# Patient Record
Sex: MALE
[De-identification: names, ages, dates, MRNs, and addresses within clinical notes are randomized per-mention and may not be internally consistent; named-entity substitution may affect disease eponyms.]

---

## 2019-03-01 PROBLEM — Z00.00 ENCOUNTER FOR PREVENTIVE HEALTH EXAMINATION: Status: ACTIVE | Noted: 2019-03-01

## 2019-03-11 ENCOUNTER — APPOINTMENT (OUTPATIENT)
Dept: ENDOCRINOLOGY | Facility: CLINIC | Age: 54
End: 2019-03-11
Payer: COMMERCIAL

## 2019-03-11 VITALS
WEIGHT: 240 LBS | OXYGEN SATURATION: 97 % | RESPIRATION RATE: 16 BRPM | DIASTOLIC BLOOD PRESSURE: 80 MMHG | HEIGHT: 72 IN | HEART RATE: 94 BPM | BODY MASS INDEX: 32.51 KG/M2 | SYSTOLIC BLOOD PRESSURE: 130 MMHG

## 2019-03-11 DIAGNOSIS — I82.409 ACUTE EMBOLISM AND THROMBOSIS OF UNSPECIFIED DEEP VEINS OF UNSPECIFIED LOWER EXTREMITY: ICD-10-CM

## 2019-03-11 PROCEDURE — 99244 OFF/OP CNSLTJ NEW/EST MOD 40: CPT | Mod: 25

## 2019-03-11 RX ORDER — RIVAROXABAN 20 MG/1
20 TABLET, FILM COATED ORAL
Refills: 0 | Status: ACTIVE | COMMUNITY

## 2019-03-11 RX ORDER — FLASH GLUCOSE SENSOR
KIT MISCELLANEOUS
Qty: 2 | Refills: 12 | Status: ACTIVE | COMMUNITY
Start: 2019-03-11 | End: 1900-01-01

## 2019-03-11 RX ORDER — BLOOD SUGAR DIAGNOSTIC
STRIP MISCELLANEOUS 4 TIMES DAILY
Qty: 400 | Refills: 3 | Status: ACTIVE | COMMUNITY
Start: 2019-03-11 | End: 1900-01-01

## 2019-03-11 RX ORDER — FLASH GLUCOSE SCANNING READER
EACH MISCELLANEOUS
Qty: 1 | Refills: 0 | Status: ACTIVE | COMMUNITY
Start: 2019-03-11 | End: 1900-01-01

## 2019-03-11 RX ORDER — LANCETS 30 GAUGE
EACH MISCELLANEOUS
Qty: 400 | Refills: 3 | Status: ACTIVE | COMMUNITY
Start: 2019-03-11 | End: 1900-01-01

## 2019-03-11 NOTE — CONSULT LETTER
[Dear  ___] : Dear  [unfilled], [Sincerely,] : Sincerely, [FreeTextEntry1] : Thank you for referring  Mr. GEMA EVANS to me for evaluation and treatment. Please, see attached consultation note. As always, if there are specific questions you would like to discuss, please feel free to contact me.\par Thank you for the courtesy of this evaluation.\par  [FreeTextEntry3] : Cyndie Bocanegra MD, FACE, ECNU\par

## 2019-03-11 NOTE — HISTORY OF PRESENT ILLNESS
[FreeTextEntry1] : HISTORY OF PRESENT ILLNESS. \par \par Mr. EVANS was diagnosed with Diabetes Mellitus Type 2 in 2016. \par Denies known complications of retinopathy, nephropathy, or neuropathy. \par Reports history HTN, dyslipidemia. Patient is accompanied by his mother who provides most of the information. She believes,that  he had  an angiogram done some time ago, which "showed some blockage".  \par Presently on metformin 500mg 2 tabs BID, metoprolol xl 25mg, lipitor 10mg HS. He was prescribed jardiance, but has not started yet.\par Not checking BS at home (unable to learn how to do it)\par Fingerstick glucose in the office today is 248 mg/dL 3  hours after eating. \par Diet:. non following ADA diet\par Exercise: none\par \par Last dilated eye -  long time ago.\par Last podiatry visit  - none\par Last cardiology evaluation - Dr. Appiah (01/19)\par Last stress test - several years ago\par Last 2-D Echo - several years ag.\par Last nephrology evaluation - none\par Last neurology evaluation- none\par \par Lab review (01/19)- a1c - 7.8, creat- 0.8, LDL-69, TG-98, HDL- 42, B12- 427, TSH- 1.38\par \par

## 2019-03-11 NOTE — ASSESSMENT
[Carbohydrate Consistent Diet] : carbohydrate consistent diet [Hypoglycemia Management] : hypoglycemia management [Diabetes Foot Care] : diabetes foot care [Long Term Vascular Complications] : long term vascular complications of diabetes [Self Monitoring of Blood Glucose] : self monitoring of blood glucose [FreeTextEntry1] : Current approaches to diabetes management are discussed with the patient. \par Target ranges for blood sugar, blood pressure and cholesterol reviewed, and risk reduction strategies verified. \par Hypoglycemia precautions reviewed with the patient. \par Suggested extensive diabetes education program, including nutritional and diabetes teaching and evaluation. \par Proper dietary restrictions and exercise routines discussed. \par Glucometer/SMBG and log book charting discussed.\par - d/c metformin\par - janumet 50/1g bid (R+B), jardiance 10mg qd (R+B)\par - hydration\par - continue lipitor, toprol. Monitor urine for microlabumins. Might require ACE/ARBs\par - SMBG, log book. FreeStyle louie\par - RTC 2 months. labs prior

## 2019-05-13 ENCOUNTER — APPOINTMENT (OUTPATIENT)
Dept: ENDOCRINOLOGY | Facility: CLINIC | Age: 54
End: 2019-05-13
Payer: COMMERCIAL

## 2019-05-13 VITALS
RESPIRATION RATE: 17 BRPM | OXYGEN SATURATION: 97 % | WEIGHT: 240 LBS | SYSTOLIC BLOOD PRESSURE: 124 MMHG | BODY MASS INDEX: 32.51 KG/M2 | DIASTOLIC BLOOD PRESSURE: 70 MMHG | HEIGHT: 72 IN

## 2019-05-13 LAB — GLUCOSE BLDC GLUCOMTR-MCNC: 129

## 2019-05-13 PROCEDURE — 95251 CONT GLUC MNTR ANALYSIS I&R: CPT

## 2019-05-13 PROCEDURE — 99214 OFFICE O/P EST MOD 30 MIN: CPT | Mod: 25

## 2019-05-13 PROCEDURE — 82962 GLUCOSE BLOOD TEST: CPT

## 2019-05-13 RX ORDER — METFORMIN HYDROCHLORIDE 500 MG/1
500 TABLET, COATED ORAL
Refills: 0 | Status: DISCONTINUED | COMMUNITY
End: 2019-05-13

## 2019-05-13 NOTE — ASSESSMENT
[Carbohydrate Consistent Diet] : carbohydrate consistent diet [Long Term Vascular Complications] : long term vascular complications of diabetes [Diabetes Foot Care] : diabetes foot care [Hypoglycemia Management] : hypoglycemia management [Self Monitoring of Blood Glucose] : self monitoring of blood glucose [FreeTextEntry1] : - janumet 50/1g bid , incr jardiance 25mg qd \par - prandin 1mg ac breakfast\par - will consider GLP1 agonists next visit (pt is reluctant at present)\par - hydration\par - continue lipitor, toprol. Monitor urine for microalbumin. Might require ACE/ARBs\par - SMBG, log book. Improve diet\par - RTC 3 months. labs prior

## 2019-05-13 NOTE — HISTORY OF PRESENT ILLNESS
[FreeTextEntry1] : Feels well on  janumet 50/1g bid , jardiance 10mg qd \par Jose 14 \par Days analyzed: 4/30/19-5/13/19\par in target- 69% of the time, 31 % above 180 with 5% above 250\par spikes after breakfast\par a1c- 7.8, g/m- 1.0, creat 0.67, K- 4.6\par \par \par HISTORY OF PRESENT ILLNESS. \par \par Mr. EVANS was diagnosed with Diabetes Mellitus Type 2 in 2016. \par Denies known complications of retinopathy, nephropathy, or neuropathy. \par Reports history HTN, dyslipidemia. Patient is accompanied by his mother who provides most of the information. She believes,that  he had  an angiogram done some time ago, which "showed some blockage".  \par Presently on metformin 500mg 2 tabs BID, metoprolol xl 25mg, lipitor 10mg HS. He was prescribed jardiance, but has not started yet.\par Not checking BS at home (unable to learn how to do it)\par Fingerstick glucose in the office today is 248 mg/dL 3  hours after eating. \par Diet:. non following ADA diet\par Exercise: none\par \par Last dilated eye -  long time ago.\par Last podiatry visit  - none\par Last cardiology evaluation - Dr. Appiah (01/19)\par Last stress test - several years ago\par Last 2-D Echo - several years ag.\par Last nephrology evaluation - none\par Last neurology evaluation- none\par \par Lab review (01/19)- a1c - 7.8, creat- 0.8, LDL-69, TG-98, HDL- 42, B12- 427, TSH- 1.38\par \par

## 2019-09-16 ENCOUNTER — LABORATORY RESULT (OUTPATIENT)
Age: 54
End: 2019-09-16

## 2019-09-23 ENCOUNTER — APPOINTMENT (OUTPATIENT)
Dept: ENDOCRINOLOGY | Facility: CLINIC | Age: 54
End: 2019-09-23
Payer: COMMERCIAL

## 2019-09-23 VITALS
HEART RATE: 94 BPM | DIASTOLIC BLOOD PRESSURE: 70 MMHG | OXYGEN SATURATION: 97 % | WEIGHT: 241 LBS | SYSTOLIC BLOOD PRESSURE: 130 MMHG | BODY MASS INDEX: 32.64 KG/M2 | RESPIRATION RATE: 17 BRPM | HEIGHT: 72 IN

## 2019-09-23 LAB — GLUCOSE BLDC GLUCOMTR-MCNC: 113

## 2019-09-23 PROCEDURE — 99214 OFFICE O/P EST MOD 30 MIN: CPT | Mod: 25

## 2019-09-23 PROCEDURE — 95251 CONT GLUC MNTR ANALYSIS I&R: CPT

## 2019-09-23 NOTE — HISTORY OF PRESENT ILLNESS
[FreeTextEntry1] : F/u for DM2\par \par *** Sep 23, 2019 ***\par \par on  janumet 50/1g bid ,  jardiance 25mg qd , prandin 1mg ac breakfast\par Feels well \par a1c- 6.9, f/am- 242\par LDL- 73, TG- 63, HDL- 31\par TSH- 2.2\par \par Jose 14 \par Days analyzed: 9/15/19- 9/23/19\par in target- 85% of the time, 5 % above 180 with 0% above 250, below 70- 10%\par *** May 13, 2019 ***\par \par Jose 14 \par Days analyzed: 4/30/19-5/13/19\par in target- 69% of the time, 31 % above 180 with 5% above 250\par spikes after breakfast\par a1c- 7.8, g/m- 1.0, creat 0.67, K- 4.6\par \par \par HISTORY OF PRESENT ILLNESS. \par \par Mr. EVANS was diagnosed with Diabetes Mellitus Type 2 in 2016. \par Denies known complications of retinopathy, nephropathy, or neuropathy. \par Reports history HTN, dyslipidemia. Patient is accompanied by his mother who provides most of the information. She believes,that  he had  an angiogram done some time ago, which "showed some blockage".  \par Presently on metformin 500mg 2 tabs BID, metoprolol xl 25mg, lipitor 10mg HS. He was prescribed jardiance, but has not started yet.\par Not checking BS at home (unable to learn how to do it)\par Fingerstick glucose in the office today is 248 mg/dL 3  hours after eating. \par Diet:. non following ADA diet\par Exercise: none\par \par Last dilated eye -  long time ago.\par Last podiatry visit  - none\par Last cardiology evaluation - Dr. Appiah (01/19)\par Last stress test - several years ago\par Last 2-D Echo - several years ag.\par Last nephrology evaluation - none\par Last neurology evaluation- none\par \par Lab review (01/19)- a1c - 7.8, creat- 0.8, LDL-69, TG-98, HDL- 42, B12- 427, TSH- 1.38\par \par

## 2019-09-23 NOTE — ASSESSMENT
[Carbohydrate Consistent Diet] : carbohydrate consistent diet [Hypoglycemia Management] : hypoglycemia management [Diabetes Foot Care] : diabetes foot care [Long Term Vascular Complications] : long term vascular complications of diabetes [Self Monitoring of Blood Glucose] : self monitoring of blood glucose [FreeTextEntry1] : - janumet 50/1g bid , jardiance 25mg qd \par - hold prandin for now. Consider acarbose vs GLP1 agonists next visit (pt is reluctant at present)\par - hydration\par - continue lipitor, toprol. Monitor urine for microalbumin. Might require ACE/ARBs\par - incr exercise\par - SMBG, log book. Improve diet\par - RTC 3 months. labs prior

## 2019-12-10 ENCOUNTER — RX RENEWAL (OUTPATIENT)
Age: 54
End: 2019-12-10

## 2020-01-28 LAB
ALBUMIN SERPL ELPH-MCNC: 4.9 G/DL
ALP BLD-CCNC: 100 U/L
ALT SERPL-CCNC: 18 U/L
ANION GAP SERPL CALC-SCNC: 16 MMOL/L
AST SERPL-CCNC: 16 U/L
BILIRUB SERPL-MCNC: 0.5 MG/DL
BUN SERPL-MCNC: 16 MG/DL
CALCIUM SERPL-MCNC: 10.2 MG/DL
CHLORIDE SERPL-SCNC: 100 MMOL/L
CHOLEST SERPL-MCNC: 149 MG/DL
CHOLEST/HDLC SERPL: 4.1 RATIO
CO2 SERPL-SCNC: 26 MMOL/L
CREAT SERPL-MCNC: 0.7 MG/DL
ESTIMATED AVERAGE GLUCOSE: 151 MG/DL
FRUCTOSAMINE SERPL-MCNC: 273 UMOL/L
GLUCOSE SERPL-MCNC: 136 MG/DL
HBA1C MFR BLD HPLC: 6.9 %
HDLC SERPL-MCNC: 36 MG/DL
LDLC SERPL CALC-MCNC: 95 MG/DL
POTASSIUM SERPL-SCNC: 4.9 MMOL/L
PROT SERPL-MCNC: 7.8 G/DL
SODIUM SERPL-SCNC: 142 MMOL/L
T4 FREE SERPL-MCNC: 1.2 NG/DL
TRIGL SERPL-MCNC: 87 MG/DL
TSH SERPL-ACNC: 1.92 UIU/ML

## 2020-01-29 LAB — 25(OH)D3 SERPL-MCNC: 49.2 NG/ML

## 2020-02-03 ENCOUNTER — RESULT CHARGE (OUTPATIENT)
Age: 55
End: 2020-02-03

## 2020-02-03 ENCOUNTER — APPOINTMENT (OUTPATIENT)
Dept: ENDOCRINOLOGY | Facility: CLINIC | Age: 55
End: 2020-02-03
Payer: COMMERCIAL

## 2020-02-03 VITALS
WEIGHT: 259 LBS | HEART RATE: 106 BPM | HEIGHT: 72 IN | OXYGEN SATURATION: 94 % | RESPIRATION RATE: 18 BRPM | SYSTOLIC BLOOD PRESSURE: 140 MMHG | DIASTOLIC BLOOD PRESSURE: 70 MMHG | BODY MASS INDEX: 35.08 KG/M2

## 2020-02-03 LAB — GLUCOSE BLDC GLUCOMTR-MCNC: 118

## 2020-02-03 PROCEDURE — 99214 OFFICE O/P EST MOD 30 MIN: CPT

## 2020-02-03 RX ORDER — SITAGLIPTIN AND METFORMIN HYDROCHLORIDE 50; 1000 MG/1; MG/1
50-1000 TABLET, FILM COATED ORAL TWICE DAILY
Qty: 180 | Refills: 2 | Status: DISCONTINUED | COMMUNITY
Start: 2019-03-11 | End: 2020-02-03

## 2020-02-03 NOTE — ASSESSMENT
[Carbohydrate Consistent Diet] : carbohydrate consistent diet [Hypoglycemia Management] : hypoglycemia management [Diabetes Foot Care] : diabetes foot care [Long Term Vascular Complications] : long term vascular complications of diabetes [Self Monitoring of Blood Glucose] : self monitoring of blood glucose [FreeTextEntry1] : - janumet is no longer covered- will switch to jentadueto 2.5-1000mg bid, continue jardiance 25mg qd \par - d/c prandin. Consider acarbose vs GLP1 agonists next visit (pt is reluctant at present)\par - hydration\par - resume  toprol XL 50 mg. Monitor urine for microalbumin. Might require ACE/ARBs\par - increase lipitor 20 mg HS\par - incr exercise\par - SMBG, log book. Improve diet\par - RTC 3 months. labs prior

## 2020-02-03 NOTE — HISTORY OF PRESENT ILLNESS
[FreeTextEntry1] : F/u for DM2\par \par *** Feb 03, 2020 ***\par \par on  janumet 50/1g bid , jardiance 25mg qd, lipitor 10 mg HS\par stopped taking toprol\par occasionally uses prandin ac breakfast\par per pt- janumet is no longer covered\par a1c- 6.9\par LDL- 95\par \par no log, reports occas hypo's post breakfast when using prandin\par \par *** Sep 23, 2019 ***\par \par on  janumet 50/1g bid ,  jardiance 25mg qd , prandin 1mg ac breakfast\par Feels well \par a1c- 6.9, f/am- 242\par LDL- 73, TG- 63, HDL- 31\par TSH- 2.2\par \par Jose 14 \par Days analyzed: 9/15/19- 9/23/19\par in target- 85% of the time, 5 % above 180 with 0% above 250, below 70- 10%\par \par *** May 13, 2019 ***\par \par Jose 14 \par Days analyzed: 4/30/19-5/13/19\par in target- 69% of the time, 31 % above 180 with 5% above 250\par spikes after breakfast\par a1c- 7.8, g/m- 1.0, creat 0.67, K- 4.6\par \par \par HISTORY OF PRESENT ILLNESS. \par \par Mr. EVANS was diagnosed with Diabetes Mellitus Type 2 in 2016. \par Denies known complications of retinopathy, nephropathy, or neuropathy. \par Reports history HTN, dyslipidemia. Patient is accompanied by his mother who provides most of the information. She believes,that  he had  an angiogram done some time ago, which "showed some blockage".  \par Presently on metformin 500mg 2 tabs BID, metoprolol xl 25mg, lipitor 10mg HS. He was prescribed jardiance, but has not started yet.\par Not checking BS at home (unable to learn how to do it)\par Fingerstick glucose in the office today is 248 mg/dL 3  hours after eating. \par Diet:. non following ADA diet\par Exercise: none\par \par Last dilated eye -  long time ago.\par Last podiatry visit  - none\par Last cardiology evaluation - Dr. Appiah (01/19)\par Last stress test - several years ago\par Last 2-D Echo - several years ag.\par Last nephrology evaluation - none\par Last neurology evaluation- none\par \par Lab review (01/19)- a1c - 7.8, creat- 0.8, LDL-69, TG-98, HDL- 42, B12- 427, TSH- 1.38\par \par

## 2020-02-13 ENCOUNTER — RX RENEWAL (OUTPATIENT)
Age: 55
End: 2020-02-13

## 2020-05-04 ENCOUNTER — APPOINTMENT (OUTPATIENT)
Dept: ENDOCRINOLOGY | Facility: CLINIC | Age: 55
End: 2020-05-04

## 2020-11-03 ENCOUNTER — RX RENEWAL (OUTPATIENT)
Age: 55
End: 2020-11-03

## 2021-01-24 ENCOUNTER — RX RENEWAL (OUTPATIENT)
Age: 56
End: 2021-01-24

## 2021-01-31 ENCOUNTER — RX RENEWAL (OUTPATIENT)
Age: 56
End: 2021-01-31

## 2021-02-18 ENCOUNTER — RX RENEWAL (OUTPATIENT)
Age: 56
End: 2021-02-18

## 2021-04-22 ENCOUNTER — RX RENEWAL (OUTPATIENT)
Age: 56
End: 2021-04-22

## 2021-07-11 ENCOUNTER — RX RENEWAL (OUTPATIENT)
Age: 56
End: 2021-07-11

## 2021-09-27 LAB
25(OH)D3 SERPL-MCNC: 55.8 NG/ML
ALBUMIN SERPL ELPH-MCNC: 4.4 G/DL
ALP BLD-CCNC: 97 U/L
ALT SERPL-CCNC: 19 U/L
ANION GAP SERPL CALC-SCNC: 14 MMOL/L
AST SERPL-CCNC: 11 U/L
BASOPHILS # BLD AUTO: 0.05 K/UL
BASOPHILS NFR BLD AUTO: 0.8 %
BILIRUB SERPL-MCNC: 0.3 MG/DL
BUN SERPL-MCNC: 13 MG/DL
CALCIUM SERPL-MCNC: 9.5 MG/DL
CHLORIDE SERPL-SCNC: 102 MMOL/L
CHOLEST SERPL-MCNC: 117 MG/DL
CO2 SERPL-SCNC: 23 MMOL/L
CREAT SERPL-MCNC: 0.69 MG/DL
EOSINOPHIL # BLD AUTO: 0.26 K/UL
EOSINOPHIL NFR BLD AUTO: 4.2 %
FOLATE SERPL-MCNC: >20 NG/ML
FRUCTOSAMINE SERPL-MCNC: 352 UMOL/L
GLUCOSE SERPL-MCNC: 170 MG/DL
HCT VFR BLD CALC: 45.8 %
HDLC SERPL-MCNC: 32 MG/DL
HGB BLD-MCNC: 14.6 G/DL
IMM GRANULOCYTES NFR BLD AUTO: 0.5 %
LDLC SERPL CALC-MCNC: 68 MG/DL
LYMPHOCYTES # BLD AUTO: 1.43 K/UL
LYMPHOCYTES NFR BLD AUTO: 23 %
MAN DIFF?: NORMAL
MCHC RBC-ENTMCNC: 28 PG
MCHC RBC-ENTMCNC: 31.9 GM/DL
MCV RBC AUTO: 87.9 FL
MONOCYTES # BLD AUTO: 0.54 K/UL
MONOCYTES NFR BLD AUTO: 8.7 %
NEUTROPHILS # BLD AUTO: 3.92 K/UL
NEUTROPHILS NFR BLD AUTO: 62.8 %
NONHDLC SERPL-MCNC: 85 MG/DL
PLATELET # BLD AUTO: 246 K/UL
POTASSIUM SERPL-SCNC: 4.5 MMOL/L
PROT SERPL-MCNC: 7.1 G/DL
RBC # BLD: 5.21 M/UL
RBC # FLD: 13.9 %
SODIUM SERPL-SCNC: 139 MMOL/L
T4 FREE SERPL-MCNC: 1.2 NG/DL
TRIGL SERPL-MCNC: 84 MG/DL
TSH SERPL-ACNC: 1.65 UIU/ML
VIT B12 SERPL-MCNC: 1020 PG/ML
WBC # FLD AUTO: 6.23 K/UL

## 2021-09-28 LAB
CREAT SPEC-SCNC: 72 MG/DL
ESTIMATED AVERAGE GLUCOSE: 235 MG/DL
HBA1C MFR BLD HPLC: 9.8 %
MICROALBUMIN 24H UR DL<=1MG/L-MCNC: 1.5 MG/DL
MICROALBUMIN/CREAT 24H UR-RTO: 21 MG/G

## 2021-10-05 ENCOUNTER — APPOINTMENT (OUTPATIENT)
Dept: ENDOCRINOLOGY | Facility: CLINIC | Age: 56
End: 2021-10-05
Payer: COMMERCIAL

## 2021-10-05 VITALS
SYSTOLIC BLOOD PRESSURE: 119 MMHG | WEIGHT: 250 LBS | RESPIRATION RATE: 17 BRPM | TEMPERATURE: 98 F | HEART RATE: 86 BPM | BODY MASS INDEX: 33.86 KG/M2 | DIASTOLIC BLOOD PRESSURE: 70 MMHG | HEIGHT: 72 IN | OXYGEN SATURATION: 98 %

## 2021-10-05 DIAGNOSIS — E11.65 TYPE 2 DIABETES MELLITUS WITH HYPERGLYCEMIA: ICD-10-CM

## 2021-10-05 DIAGNOSIS — I10 ESSENTIAL (PRIMARY) HYPERTENSION: ICD-10-CM

## 2021-10-05 DIAGNOSIS — E78.5 HYPERLIPIDEMIA, UNSPECIFIED: ICD-10-CM

## 2021-10-05 LAB — GLUCOSE BLDC GLUCOMTR-MCNC: 155

## 2021-10-05 PROCEDURE — 99214 OFFICE O/P EST MOD 30 MIN: CPT

## 2021-10-05 PROCEDURE — 95250 CONT GLUC MNTR PHYS/QHP EQP: CPT

## 2021-10-05 PROCEDURE — 95251 CONT GLUC MNTR ANALYSIS I&R: CPT

## 2021-10-05 RX ORDER — LINAGLIPTIN AND METFORMIN HYDROCHLORIDE 2.5; 1 MG/1; MG/1
2.5-1 TABLET, FILM COATED ORAL
Qty: 180 | Refills: 3 | Status: ACTIVE | COMMUNITY
Start: 2020-02-03 | End: 1900-01-01

## 2021-10-05 RX ORDER — REPAGLINIDE 1 MG/1
1 TABLET ORAL
Qty: 90 | Refills: 2 | Status: DISCONTINUED | COMMUNITY
Start: 2019-05-13 | End: 2021-10-05

## 2021-10-05 RX ORDER — ATORVASTATIN CALCIUM 20 MG/1
20 TABLET, FILM COATED ORAL
Qty: 90 | Refills: 3 | Status: ACTIVE | COMMUNITY
Start: 2021-01-31 | End: 1900-01-01

## 2021-10-05 RX ORDER — EMPAGLIFLOZIN 25 MG/1
25 TABLET, FILM COATED ORAL DAILY
Qty: 90 | Refills: 3 | Status: ACTIVE | COMMUNITY
Start: 1900-01-01 | End: 1900-01-01

## 2021-10-05 NOTE — HISTORY OF PRESENT ILLNESS
[FreeTextEntry1] : F/u for diabetes management\par \par *** Oct 05, 2021 ***\par \par missed f/u visits\par ran out of jentadueto, taking metformin 1000 mg bid, jardiance 25 mg qd\par no recent labs\par \par *** Feb 03, 2020 ***\par \par on  janumet 50/1g bid , jardiance 25mg qd, lipitor 10 mg HS\par stopped taking toprol\par occasionally uses prandin ac breakfast\par per pt- janumet is no longer covered\par a1c- 6.9\par LDL- 95\par \par no log, reports occas hypo's post breakfast when using prandin\par \par *** Sep 23, 2019 ***\par \par on  janumet 50/1g bid ,  jardiance 25mg qd , prandin 1mg ac breakfast\par Feels well \par a1c- 6.9, f/am- 242\par LDL- 73, TG- 63, HDL- 31\par TSH- 2.2\par \par Jose 14 \par Days analyzed: 9/15/19- 9/23/19\par in target- 85% of the time, 5 % above 180 with 0% above 250, below 70- 10%\par \par *** May 13, 2019 ***\par \par Jose 14 \par Days analyzed: 4/30/19-5/13/19\par in target- 69% of the time, 31 % above 180 with 5% above 250\par spikes after breakfast\par a1c- 7.8, g/m- 1.0, creat 0.67, K- 4.6\par \par \par HISTORY OF PRESENT ILLNESS. \par \par Mr. EVANS was diagnosed with Diabetes Mellitus Type 2 in 2016. \par Denies known complications of retinopathy, nephropathy, or neuropathy. \par Reports history HTN, dyslipidemia. Patient is accompanied by his mother who provides most of the information. She believes,that  he had  an angiogram done some time ago, which "showed some blockage".  \par Presently on metformin 500mg 2 tabs BID, metoprolol xl 25mg, lipitor 10mg HS. He was prescribed jardiance, but has not started yet.\par Not checking BS at home (unable to learn how to do it)\par Fingerstick glucose in the office today is 248 mg/dL 3  hours after eating. \par Diet:. non following ADA diet\par Exercise: none\par \par Lab review (01/19)- a1c - 7.8, creat- 0.8, LDL-69, TG-98, HDL- 42, B12- 427, TSH- 1.38\par \par ***\par \par Last dilated eye -  long time ago.\par Last podiatry visit  - 9/21\par Last cardiology evaluation - Dr. Appiah (01/19)\par Last stress test - several years ago\par Last 2-D Echo - several years ag.\par Last nephrology evaluation - none\par Last neurology evaluation- none

## 2021-10-05 NOTE — ASSESSMENT
[Importance of Diet and Exercise] : importance of diet and exercise to improve glycemic control, achieve weight loss and improve cardiovascular health [Exercise/Effect on Glucose] : exercise/effect on glucose [Glucagon Use] : glucagon use [Retinopathy Screening] : Patient was referred to ophthalmology for retinopathy screening [Diabetic Medications] : Risks and benefits of diabetic medications were discussed [Carbohydrate Consistent Diet] : carbohydrate consistent diet [Hypoglycemia Management] : hypoglycemia management [Diabetes Foot Care] : diabetes foot care [Long Term Vascular Complications] : long term vascular complications of diabetes [Self Monitoring of Blood Glucose] : self monitoring of blood glucose [FreeTextEntry1] : - adherence is reiterated\par - strongly advised on glp1 agonists or insulin- patient categorically declined\par - d/c metformin\par - resume jentadueto 2.5-1000mg bid, jardiance 25mg qd \par - add Amaryl 2 mg qd\par - hydration\par - toprol XL 50 mg. Monitor urine for microalbumin. Might require ACE/ARBs\par - lipitor 20 mg HS\par - incr exercise\par - SMBG, log book. Improve diet\par - Jose PRO\par - RTC 3 months. labs prior

## 2021-11-02 ENCOUNTER — NON-APPOINTMENT (OUTPATIENT)
Age: 56
End: 2021-11-02

## 2022-03-17 ENCOUNTER — APPOINTMENT (OUTPATIENT)
Dept: ENDOCRINOLOGY | Facility: CLINIC | Age: 57
End: 2022-03-17

## 2022-07-13 ENCOUNTER — RX RENEWAL (OUTPATIENT)
Age: 57
End: 2022-07-13

## 2022-09-19 ENCOUNTER — RX RENEWAL (OUTPATIENT)
Age: 57
End: 2022-09-19

## 2022-09-19 RX ORDER — GLIMEPIRIDE 2 MG/1
2 TABLET ORAL
Qty: 30 | Refills: 1 | Status: ACTIVE | COMMUNITY
Start: 2021-10-05 | End: 1900-01-01

## 2022-10-10 ENCOUNTER — RX RENEWAL (OUTPATIENT)
Age: 57
End: 2022-10-10

## 2022-10-10 RX ORDER — METOPROLOL SUCCINATE 50 MG/1
50 TABLET, EXTENDED RELEASE ORAL
Qty: 90 | Refills: 3 | Status: ACTIVE | COMMUNITY
Start: 2021-01-31 | End: 1900-01-01

## 2024-06-18 ENCOUNTER — APPOINTMENT (OUTPATIENT)
Dept: ENDOCRINOLOGY | Facility: CLINIC | Age: 59
End: 2024-06-18